# Patient Record
Sex: FEMALE | Race: BLACK OR AFRICAN AMERICAN | Employment: FULL TIME | URBAN - METROPOLITAN AREA
[De-identification: names, ages, dates, MRNs, and addresses within clinical notes are randomized per-mention and may not be internally consistent; named-entity substitution may affect disease eponyms.]

---

## 2017-06-26 ENCOUNTER — OFFICE VISIT (OUTPATIENT)
Dept: OBGYN CLINIC | Age: 52
End: 2017-06-26
Payer: COMMERCIAL

## 2017-06-26 ENCOUNTER — HOSPITAL ENCOUNTER (OUTPATIENT)
Age: 52
Setting detail: SPECIMEN
Discharge: HOME | End: 2017-06-26
Payer: COMMERCIAL

## 2017-06-26 VITALS
WEIGHT: 177 LBS | SYSTOLIC BLOOD PRESSURE: 122 MMHG | BODY MASS INDEX: 33.42 KG/M2 | DIASTOLIC BLOOD PRESSURE: 80 MMHG | HEIGHT: 61 IN

## 2017-06-26 DIAGNOSIS — Z12.31 VISIT FOR SCREENING MAMMOGRAM: ICD-10-CM

## 2017-06-26 DIAGNOSIS — Z01.419 ENCOUNTER FOR GYNECOLOGICAL EXAMINATION WITHOUT ABNORMAL FINDING: Primary | ICD-10-CM

## 2017-06-26 PROCEDURE — 99396 PREV VISIT EST AGE 40-64: CPT | Performed by: OBSTETRICS & GYNECOLOGY

## 2017-06-26 RX ORDER — TOLTERODINE TARTRATE 2 MG/1
2 TABLET, EXTENDED RELEASE ORAL 2 TIMES DAILY
COMMUNITY
End: 2019-10-21 | Stop reason: SDUPTHER

## 2017-06-28 LAB — CYTOLOGY REPORT: NORMAL

## 2017-11-16 RX ORDER — TOLTERODINE 4 MG/1
4 CAPSULE, EXTENDED RELEASE ORAL DAILY
Qty: 30 CAPSULE | Refills: 11 | Status: SHIPPED | OUTPATIENT
Start: 2017-11-16 | End: 2018-11-16 | Stop reason: SDUPTHER

## 2018-07-05 ENCOUNTER — HOSPITAL ENCOUNTER (OUTPATIENT)
Age: 53
Setting detail: SPECIMEN
Discharge: HOME | End: 2018-07-05
Payer: COMMERCIAL

## 2018-07-05 ENCOUNTER — OFFICE VISIT (OUTPATIENT)
Dept: OBGYN CLINIC | Age: 53
End: 2018-07-05
Payer: COMMERCIAL

## 2018-07-05 VITALS
BODY MASS INDEX: 35.12 KG/M2 | DIASTOLIC BLOOD PRESSURE: 78 MMHG | SYSTOLIC BLOOD PRESSURE: 122 MMHG | HEIGHT: 61 IN | WEIGHT: 186 LBS

## 2018-07-05 DIAGNOSIS — D05.12 DUCTAL CARCINOMA IN SITU (DCIS) OF LEFT BREAST: ICD-10-CM

## 2018-07-05 DIAGNOSIS — D05.01 NEOPLASM OF RIGHT BREAST, PRIMARY TUMOR STAGING CATEGORY TIS: LOBULAR CARCINOMA IN SITU (LCIS): ICD-10-CM

## 2018-07-05 DIAGNOSIS — Z01.419 ENCOUNTER FOR GYNECOLOGICAL EXAMINATION WITHOUT ABNORMAL FINDING: Primary | ICD-10-CM

## 2018-07-05 DIAGNOSIS — Z12.31 VISIT FOR SCREENING MAMMOGRAM: ICD-10-CM

## 2018-07-05 PROCEDURE — 99396 PREV VISIT EST AGE 40-64: CPT | Performed by: OBSTETRICS & GYNECOLOGY

## 2018-07-05 RX ORDER — MULTIVIT WITH MINERALS/LUTEIN
250 TABLET ORAL DAILY
COMMUNITY

## 2018-07-05 NOTE — PROGRESS NOTES
Used     History   Alcohol Use No     Current Outpatient Prescriptions   Medication Sig Dispense Refill    Ascorbic Acid (VITAMIN C) 250 MG tablet Take 250 mg by mouth daily      tolterodine (DETROL LA) 4 MG extended release capsule Take 1 capsule by mouth daily 30 capsule 11    Loratadine (CLARITIN PO) Take by mouth      rosuvastatin (CRESTOR) 10 MG tablet Take 10 mg by mouth daily.  tolterodine (DETROL) 2 MG tablet Take 2 mg by mouth 2 times daily       No current facility-administered medications for this visit. Allergies: Allergies   Allergen Reactions    Latex     Shellfish-Derived Products Swelling       Gynecologic History:  No LMP recorded. Patient has had a hysterectomy.   Sexually Active: No  STD History: No  Abnormal Pap History no  Birth Control: No    Obstetric History       T0      L0     SAB0   TAB0   Ectopic0   Molar0   Multiple0   Live Births0      ______________________________________________________________________  REVIEW OF SYSTEMS:        Constitutional:  Unexpected weight change no  Neurological:  Frequent headaches  no  Ophthalmic:  Recent visual changes no  ENT:   Difficulty swallowing  no  Breast:              Masses   no     Respiratory:  Shortness of breath  no    Cardiovascular: Chest pain   no     Gastrointestinal: Chronic diarrhea/constipation no   Urogenital:  Urinary incontinence  yes                                         Heavy/irregular periods           no                                      Vaginal discharge                   no  Hematological: Bruises easy   no     Endocrine:  Nipple Discharge  no     Hot/Cold Intolerance  no   Psychological:  Mood and affect were wnl yes                                                                                                                                           Physical Exam:    Vitals:    18 0939   BP: 122/78   Site: Left Arm   Position: Sitting   Cuff Size: Medium Adult       General Appearance:  She does not appear to be in any distress. This  is a well developed, well nourished, well groomed female. Neurological:  The patient is alert and oriented to time, place, person, and situation without any noted sensory motor deficits. Skin:  A brief inspection of the skin revealed no rashes or lesions. Neck:  The neck was supple. There is no tracheal deviation, thyromegaly or supraclavicular adenopathy appreciated. Breast:   The patients breasts have multiple bilateral reconstruction scars present. Breasts are nontender and there  were no definite suspicious masses, discharge or pathologic skin changes. There is no supraclavicular or axillary adenopathy bilaterally. Respiratory: There was unlabored respiratory effort. Lungs clear to ascultation without wheezes, rales or rhonchi in all fields bilaterally. Cardiovascular:  Normal sinus rhythm with a regular rate and without murmur, rubs or gallops. Abdomen: The abdomen was soft and non-tender with no guarding, rebound, CVAT or rigidity. No hernias were appreciated. Bowel sounds were normally active. Pelvic exam:  No vulvar or vaginal lesions noted. Normal vaginal discharge present without significant cystocele, rectocele or enterocele. Uterus surgically absent with good support of the vaginal apex. Adnexa nontender and without abnormal masses bilaterally. Extremities:  Mild right upper extremity lymphedema present. FROM and nontender without clubbing or cyanosis. ASSESSMENT:         ICD-10-CM ICD-9-CM    1. Encounter for gynecological examination without abnormal finding Z01.419 V72.31 PAP SMEAR   2. Ductal carcinoma in situ (DCIS) of left breast D05.12 233.0    3. Neoplasm of right breast, primary tumor staging category Tis: lobular carcinoma in situ (LCIS) D05.01 233.0                    PLAN:  We discussed her history of breast cancer as well as her family history.   She agrees to genetic counseling

## 2018-07-17 ENCOUNTER — TELEPHONE (OUTPATIENT)
Dept: OBGYN CLINIC | Age: 53
End: 2018-07-17

## 2018-07-17 DIAGNOSIS — D05.12 DUCTAL CARCINOMA IN SITU (DCIS) OF LEFT BREAST: ICD-10-CM

## 2018-07-17 DIAGNOSIS — Z90.13 HISTORY OF BILATERAL MASTECTOMY: ICD-10-CM

## 2018-07-17 DIAGNOSIS — D05.02 LOBULAR CARCINOMA IN SITU (LCIS) OF LEFT BREAST: Primary | ICD-10-CM

## 2018-07-18 ENCOUNTER — INITIAL CONSULT (OUTPATIENT)
Dept: ONCOLOGY | Age: 53
End: 2018-07-18
Payer: COMMERCIAL

## 2018-07-18 DIAGNOSIS — D05.00 NEOPLASM OF BREAST, PRIMARY TUMOR STAGING CATEGORY TIS: LOBULAR CARCINOMA IN SITU (LCIS), UNSPECIFIED LATERALITY: ICD-10-CM

## 2018-07-18 DIAGNOSIS — D05.10 DUCTAL CARCINOMA IN SITU (DCIS) OF BREAST, UNSPECIFIED LATERALITY: Primary | ICD-10-CM

## 2018-07-18 DIAGNOSIS — Z80.0 FAMILY HISTORY OF COLON CANCER: ICD-10-CM

## 2018-07-18 PROCEDURE — 96040 PR GENETIC COUNSELING, EACH 30 MIN: CPT | Performed by: GENETIC COUNSELOR, MS

## 2018-07-19 LAB — CYTOLOGY REPORT: NORMAL

## 2018-08-15 ENCOUNTER — TELEPHONE (OUTPATIENT)
Dept: ONCOLOGY | Age: 53
End: 2018-08-15

## 2018-11-16 RX ORDER — TOLTERODINE 4 MG/1
CAPSULE, EXTENDED RELEASE ORAL
Qty: 30 CAPSULE | Refills: 10 | Status: SHIPPED | OUTPATIENT
Start: 2018-11-16 | End: 2019-10-19 | Stop reason: SDUPTHER

## 2019-06-26 ENCOUNTER — TELEPHONE (OUTPATIENT)
Dept: OBGYN CLINIC | Age: 54
End: 2019-06-26

## 2019-06-26 NOTE — TELEPHONE ENCOUNTER
Called and cancelled appt due to Dr Simeon Segovia being in surgery  Left message to return our call

## 2019-07-09 ENCOUNTER — OFFICE VISIT (OUTPATIENT)
Dept: OBGYN CLINIC | Age: 54
End: 2019-07-09
Payer: COMMERCIAL

## 2019-07-09 ENCOUNTER — HOSPITAL ENCOUNTER (OUTPATIENT)
Age: 54
Setting detail: SPECIMEN
Discharge: HOME | End: 2019-07-09
Payer: COMMERCIAL

## 2019-07-09 VITALS
DIASTOLIC BLOOD PRESSURE: 72 MMHG | HEIGHT: 60 IN | BODY MASS INDEX: 35.73 KG/M2 | SYSTOLIC BLOOD PRESSURE: 122 MMHG | WEIGHT: 182 LBS

## 2019-07-09 DIAGNOSIS — Z01.419 ENCOUNTER FOR GYNECOLOGICAL EXAMINATION WITHOUT ABNORMAL FINDING: Primary | ICD-10-CM

## 2019-07-09 PROCEDURE — 99396 PREV VISIT EST AGE 40-64: CPT | Performed by: OBSTETRICS & GYNECOLOGY

## 2019-07-09 RX ORDER — VALACYCLOVIR HYDROCHLORIDE 500 MG/1
500 TABLET, FILM COATED ORAL 2 TIMES DAILY
COMMUNITY

## 2019-07-09 NOTE — PROGRESS NOTES
tablet Take 500 mg by mouth 2 times daily      tolterodine (DETROL LA) 4 MG extended release capsule TAKE 1 CAPSULE BY MOUTH ONE TIME A DAY  30 capsule 10    Ascorbic Acid (VITAMIN C) 250 MG tablet Take 250 mg by mouth daily      Loratadine (CLARITIN PO) Take by mouth      rosuvastatin (CRESTOR) 10 MG tablet Take 10 mg by mouth daily.  tolterodine (DETROL) 2 MG tablet Take 2 mg by mouth 2 times daily       No current facility-administered medications for this visit. Allergies: Allergies   Allergen Reactions    Latex     Shellfish-Derived Products Swelling       Gynecologic History:  No LMP recorded. Patient has had a hysterectomy.   Sexually Active: No  STD History: No  Abnormal Pap History no  Birth Control: No    OB History    Para Term  AB Living   0 0 0 0 0 0   SAB TAB Ectopic Molar Multiple Live Births   0 0 0 0 0 0     ______________________________________________________________________  REVIEW OF SYSTEMS:        Constitutional:  Unexpected weight change no  Neurological:  Frequent headaches  no  Ophthalmic:  Recent visual changes no  ENT:   Difficulty swallowing  no  Breast:              Masses   no     Respiratory:  Shortness of breath  no    Cardiovascular: Chest pain   no     Gastrointestinal: Chronic diarrhea/constipation no   Urogenital:  Urinary incontinence  no                                         Heavy/irregular periods           no                                      Vaginal discharge                   no  Hematological: Bruises easy   no     Endocrine:  Nipple Discharge  yes     Hot/Cold Intolerance  no   Psychological:  Mood and affect were wnl yes                                                                                                                                           Physical Exam:    Vitals:    19 0915   BP: 122/72   Site: Right Upper Arm   Position: Sitting   Cuff Size: Medium Adult   Weight: 182 lb (82.6 kg)   Height: 5' 0.08\" (1.526

## 2019-07-16 LAB — CYTOLOGY REPORT: NORMAL

## 2019-10-21 RX ORDER — TOLTERODINE 4 MG/1
CAPSULE, EXTENDED RELEASE ORAL
Qty: 30 CAPSULE | Refills: 9 | Status: SHIPPED | OUTPATIENT
Start: 2019-10-21 | End: 2020-09-24

## 2019-12-05 ENCOUNTER — HOSPITAL ENCOUNTER (OUTPATIENT)
Dept: OCCUPATIONAL THERAPY | Age: 54
Setting detail: THERAPIES SERIES
Discharge: HOME | End: 2019-12-05
Payer: COMMERCIAL

## 2019-12-05 PROCEDURE — 97535 SELF CARE MNGMENT TRAINING: CPT

## 2019-12-05 PROCEDURE — 97166 OT EVAL MOD COMPLEX 45 MIN: CPT

## 2019-12-17 ENCOUNTER — HOSPITAL ENCOUNTER (OUTPATIENT)
Dept: OCCUPATIONAL THERAPY | Age: 54
Setting detail: THERAPIES SERIES
Discharge: HOME | End: 2019-12-17
Payer: COMMERCIAL

## 2019-12-17 PROCEDURE — 97110 THERAPEUTIC EXERCISES: CPT

## 2019-12-17 PROCEDURE — 97535 SELF CARE MNGMENT TRAINING: CPT

## 2020-01-14 ENCOUNTER — HOSPITAL ENCOUNTER (OUTPATIENT)
Dept: OCCUPATIONAL THERAPY | Age: 55
Setting detail: THERAPIES SERIES
Discharge: HOME | End: 2020-01-14
Payer: COMMERCIAL

## 2020-05-26 ENCOUNTER — HOSPITAL ENCOUNTER (OUTPATIENT)
Dept: OCCUPATIONAL THERAPY | Age: 55
Setting detail: THERAPIES SERIES
Discharge: HOME | End: 2020-05-26

## 2020-05-26 NOTE — DISCHARGE SUMMARY
[x] [facility]) - [facility]  [facility]rapy  [address]  [phone]  F: [phone] [] [address] [facility] [address]   [address]  P: [phone]  F: [phone] [] [place]  [address] [place] [address]  P: [phone]  F: [phone] [] [address] [facility]   [address]   [place]: [phone]  F: [phone]           Lisa Vanessa   1965   [id number]    5/26/2020    Pt called in stating she wanted to cancel remaining visits and would call back when she was ready to proceed. Pt has not returned the call in 60-90 days and will be discharged at this time.      Electronically signed by Mariya Sandoval OT on 5/26/2020 at 3:35 PM

## 2020-07-16 ENCOUNTER — HOSPITAL ENCOUNTER (OUTPATIENT)
Age: 55
Setting detail: SPECIMEN
Discharge: HOME | End: 2020-07-16
Payer: COMMERCIAL

## 2020-07-16 ENCOUNTER — OFFICE VISIT (OUTPATIENT)
Dept: OBGYN CLINIC | Age: 55
End: 2020-07-16
Payer: COMMERCIAL

## 2020-07-16 VITALS
SYSTOLIC BLOOD PRESSURE: 118 MMHG | HEIGHT: 60 IN | DIASTOLIC BLOOD PRESSURE: 78 MMHG | WEIGHT: 153 LBS | BODY MASS INDEX: 30.04 KG/M2

## 2020-07-16 PROCEDURE — 99396 PREV VISIT EST AGE 40-64: CPT | Performed by: OBSTETRICS & GYNECOLOGY

## 2020-07-16 RX ORDER — ZINC GLUCONATE 50 MG
50 TABLET ORAL DAILY
COMMUNITY

## 2020-07-16 ASSESSMENT — PATIENT HEALTH QUESTIONNAIRE - PHQ9
SUM OF ALL RESPONSES TO PHQ9 QUESTIONS 1 & 2: 0
SUM OF ALL RESPONSES TO PHQ QUESTIONS 1-9: 0
SUM OF ALL RESPONSES TO PHQ QUESTIONS 1-9: 0
1. LITTLE INTEREST OR PLEASURE IN DOING THINGS: 0
2. FEELING DOWN, DEPRESSED OR HOPELESS: 0

## 2020-07-16 NOTE — PROGRESS NOTES
DATE OF VISIT:  20        History and Physical    Yisel Petersen    :  1965  CHIEF COMPLAINT:    Chief Complaint   Patient presents with    Annual Exam     Here for annual  Last pap 19neg                     HPI :   Yisel Petersen is a 54 y.o. female. Nulligravida    Yisel Petersen returns today for her annual exam.  She continues to be followed at [facility] for her breast care however they are not doing mammograms or MRIs. She is having regular bowel movements without constipation or diarrhea. She denies any UTI symptoms and is still having small involuntary loss of urine with coughing and sneezing. She is otherwise without any significant complaints today. Ingrid Narayan is still working for [employer] and doing it from home.  _____________________________________________________________________  Past Medical History:   Diagnosis Date    Elevated cholesterol     Fibroid     Fissure, anal                                                                    Past Surgical History:   Procedure Laterality Date    BREAST BIOPSY      Left    BREAST RECONSTRUCTION  2014  10/15    MONTSERRAT Flap reconstruction. Left breast Reconstruction    BREAST SURGERY Bilateral 2014    Masectomy- LCIS in both breasts. Elective.      COLONOSCOPY  2010    HYSTERECTOMY  2003    partial    HYSTEROSCOPY  2003     ALBERTO    OTHER SURGICAL HISTORY      Lumpectomy Left breast    WISDOM TOOTH EXTRACTION       Family History   Problem Relation Age of Onset    Colon Cancer Father 61    Hypertension Brother     Heart Attack Brother     Diabetes Brother     Hypertension Sister     Cancer Other         35s; pat great aunt      Social History     Tobacco Use   Smoking Status Never Smoker   Smokeless Tobacco Never Used     Social History     Substance and Sexual Activity   Alcohol Use No     Current Outpatient Medications   Medication Sig Dispense Refill    zinc gluconate 50 MG tablet Take 50 mg by mouth daily      tolterodine (DETROL LA) 4 MG extended release capsule TAKE 1 CAPSULE BY MOUTH ONE TIME A DAY  30 capsule 9    valACYclovir (VALTREX) 500 MG tablet Take 500 mg by mouth 2 times daily      Ascorbic Acid (VITAMIN C) 250 MG tablet Take 250 mg by mouth daily      Loratadine (CLARITIN PO) Take by mouth      rosuvastatin (CRESTOR) 10 MG tablet Take 10 mg by mouth daily. No current facility-administered medications for this visit. Allergies: Allergies   Allergen Reactions    Latex     Raspberry     Shellfish-Derived Products Swelling       Gynecologic History:  No LMP recorded. Patient has had a hysterectomy.   Sexually Active: Yes  STD History: No  Abnormal Pap History no  Birth Control: No    OB History    Para Term  AB Living   0 0 0 0 0 0   SAB TAB Ectopic Molar Multiple Live Births   0 0 0 0 0 0     ______________________________________________________________________  REVIEW OF SYSTEMS:        Constitutional:  Unexpected weight change no  Neurological:  Frequent headaches  no  Ophthalmic:  Recent visual changes no  ENT:   Difficulty swallowing  no  Breast:              Masses   no     Respiratory:  Shortness of breath  no    Cardiovascular: Chest pain   no     Gastrointestinal: Chronic diarrhea/constipation no   Urogenital:  Urinary incontinence  no                                         Heavy/irregular periods           no                                      Vaginal discharge                   no  Hematological: Bruises easy   no     Endocrine:  Nipple Discharge  no     Hot/Cold Intolerance  no   Psychological:  Mood and affect were wnl yes                                                                                                                                           Physical Exam:    Vitals:    20 0939   BP: 118/78   Site: Right Upper Arm   Position: Sitting   Cuff Size: Medium Adult   Weight: 153 lb (69.4 kg)   Height: 5' (1.524 m) General Appearance:  She does not appear to be in any distress. This  is a well developed, well nourished, well groomed female. Neurological:  The patient is alert and oriented to time, place, person, and situation without any noted sensory motor deficits. Skin:  A brief inspection of the skin revealed no rashes or lesions. Neck:  The neck was supple. There is no tracheal deviation, thyromegaly or supraclavicular adenopathy appreciated. Breast:   The patients breasts reveal bilateral reconstruction with significant keloid formation. Breasts are nontender and there  were no masses, discharge or pathologic skin changes. There is no supraclavicular or axillary adenopathy bilaterally. Respiratory: There was unlabored respiratory effort. Lungs clear to ascultation without wheezes, rales or rhonchi in all fields bilaterally. Cardiovascular:  Normal sinus rhythm with a regular rate and without murmur, rubs or gallops. Abdomen: The abdomen was soft and non-tender with no guarding, rebound, CVAT or rigidity. No hernias were appreciated. Bowel sounds were normally active. Pelvic exam:  No vulvar or vaginal lesions noted. Normal vaginal discharge present, no significant cystocele, rectocele or enterocele noted. [place] well supported. Uterus surgically absent and adnexa nontender and without abnormal masses bilaterally. Extremities:  FROM and nontender without clubbing cyanosis or edema. ASSESSMENT:         ICD-10-CM    1. Encounter for gynecological examination without abnormal finding  Z01.419 PAP SMEAR   2. Visit for screening mammogram  Z12.31                    PLAN:  Current Pap smear guidelines discussed and will discontinue annual surveillance. Return to the office in 1 year or when necessary  Patient was seen with total face to face time of 20 minutes. Sunshine Muro M.D., Mph  [facility]  [place]

## 2020-07-21 LAB — CYTOLOGY REPORT: NORMAL

## 2021-08-26 NOTE — TELEPHONE ENCOUNTER
[facility]   Hereditary Cancer Risk Assessment     Name: Angie Kumar  YOB: 1965  Date of Results Disclosure: 8/17/18    HISTORY   Ms. Irma Santos was seen for genetic counseling on 7/18/18 at the request of Dr. Jay Cooper due to her personal and family history of cancer. At that time, Ms. Irma Santos chose to pursue genetic testing via the CancerNext gene panel. These results were discussed with Ms. Irma Santos on 8/17/18 via telephone. A summary of Ms. Nohemy Cox results and recommendations are below. RESULTS  [employer] CancerNext Panel: NEGATIVE - NO CLINICALLY SIGNIFICANT MUTATIONS DETECTED   This panel included the analysis of 34 genes associated with hereditary cancer including: APC, PAULINE, BARD1, BMPR1A, BRCA1, BRCA2, BRIP1, CDH1, CDK4, CDKN2A, CHEK2, DICER1, HOXB13, EPCAM, GREM1, MLH1, MRE11A, MSH2, MSH6, MUTYH, NBN, NF1, PALB2, PMS2, POLD1, POLE, PTEN, RAD50, RAD51C, RAD51D, SMAD4, SMARCA4, STK11, and TP53. Please refer to genetic test report for technical details. We discussed that Ms. Hernandez's negative test result greatly reduces the likelihood that her personal history of cancer is due to a hereditary mutation. It is possible that her personal history of cancer may be due to a gene for which testing was not performed or which has yet to be discovered. RECOMMENDATIONS  BREAST CANCER   The outcome of Ms. Nohemy Cox genetic test results do not affect her lifetime risk for breast cancer and should continue breast cancer surveillance as directed by her physicians. OVARIAN CANCER  Without a known hereditary mutation and no known family history of ovarian cancer, Ms. Nohemy Cox risk for ovarian cancer is expected to be equal to the general population risk of 1-2%. Therefore, ovarian cancer screening or risk reducing surgery is not recommended. She should continue gynecologic cancer screening as directed by her physicians.      GENERAL CANCER   Ms. no

## 2021-12-06 ENCOUNTER — HOSPITAL ENCOUNTER (OUTPATIENT)
Age: 56
Setting detail: SPECIMEN
Discharge: HOME | End: 2021-12-06

## 2021-12-06 ENCOUNTER — NURSE ONLY (OUTPATIENT)
Dept: FAMILY MEDICINE CLINIC | Age: 56
End: 2021-12-06

## 2021-12-06 DIAGNOSIS — Z11.52 ENCOUNTER FOR SCREENING FOR COVID-19: Primary | ICD-10-CM

## 2021-12-06 ASSESSMENT — PATIENT HEALTH QUESTIONNAIRE - PHQ9
SUM OF ALL RESPONSES TO PHQ QUESTIONS 1-9: 0
1. LITTLE INTEREST OR PLEASURE IN DOING THINGS: 0
SUM OF ALL RESPONSES TO PHQ QUESTIONS 1-9: 0
SUM OF ALL RESPONSES TO PHQ9 QUESTIONS 1 & 2: 0
SUM OF ALL RESPONSES TO PHQ QUESTIONS 1-9: 0
2. FEELING DOWN, DEPRESSED OR HOPELESS: 0

## 2021-12-06 NOTE — PROGRESS NOTES
Pt to parking spot 3 for covid testing  Verified name/spelling/ w/ patient  Swabbed nasopharyngeal and sent specimen to lab  Pt tolerated

## 2021-12-07 LAB
SARS-COV-2: NORMAL
SARS-COV-2: NOT DETECTED
SOURCE: NORMAL

## 2022-03-03 ENCOUNTER — HOSPITAL ENCOUNTER (EMERGENCY)
Age: 57
Discharge: HOME | End: 2022-03-03
Attending: EMERGENCY MEDICINE
Payer: COMMERCIAL

## 2022-03-03 ENCOUNTER — APPOINTMENT (OUTPATIENT)
Dept: GENERAL RADIOLOGY | Age: 57
End: 2022-03-03
Payer: COMMERCIAL

## 2022-03-03 VITALS
SYSTOLIC BLOOD PRESSURE: 124 MMHG | TEMPERATURE: 98.2 F | HEART RATE: 83 BPM | OXYGEN SATURATION: 97 % | RESPIRATION RATE: 18 BRPM | BODY MASS INDEX: 34.55 KG/M2 | HEIGHT: 61 IN | DIASTOLIC BLOOD PRESSURE: 75 MMHG | WEIGHT: 183 LBS

## 2022-03-03 DIAGNOSIS — R07.9 CHEST PAIN, UNSPECIFIED TYPE: Primary | ICD-10-CM

## 2022-03-03 LAB
ABSOLUTE EOS #: 0.2 K/UL (ref 0–0.4)
ABSOLUTE LYMPH #: 2.8 K/UL (ref 1–4.8)
ABSOLUTE MONO #: 0.6 K/UL (ref 0.1–1.2)
ALBUMIN SERPL-MCNC: 4.7 G/DL (ref 3.5–5.2)
ALBUMIN/GLOBULIN RATIO: 1.6 (ref 1–2.5)
ALP BLD-CCNC: 76 U/L (ref 35–104)
ALT SERPL-CCNC: 18 U/L (ref 5–33)
ANION GAP SERPL CALCULATED.3IONS-SCNC: 13 MMOL/L (ref 9–17)
AST SERPL-CCNC: 16 U/L
BASOPHILS # BLD: 1 % (ref 0–2)
BASOPHILS ABSOLUTE: 0 K/UL (ref 0–0.2)
BILIRUB SERPL-MCNC: 0.53 MG/DL (ref 0.3–1.2)
BUN BLDV-MCNC: 14 MG/DL (ref 6–20)
CALCIUM SERPL-MCNC: 10.2 MG/DL (ref 8.6–10.4)
CHLORIDE BLD-SCNC: 96 MMOL/L (ref 98–107)
CO2: 27 MMOL/L (ref 20–31)
CREAT SERPL-MCNC: 0.62 MG/DL (ref 0.5–0.9)
D-DIMER QUANTITATIVE: 0.33 MG/L FEU
EOSINOPHILS RELATIVE PERCENT: 3 % (ref 1–4)
GFR AFRICAN AMERICAN: >60 ML/MIN
GFR NON-AFRICAN AMERICAN: >60 ML/MIN
GFR SERPL CREATININE-BSD FRML MDRD: ABNORMAL ML/MIN/{1.73_M2}
GLUCOSE BLD-MCNC: 107 MG/DL (ref 70–99)
HCT VFR BLD CALC: 42.1 % (ref 36–46)
HEMOGLOBIN: 14 G/DL (ref 12–16)
LIPASE: 27 U/L (ref 13–60)
LYMPHOCYTES # BLD: 52 % (ref 24–44)
MCH RBC QN AUTO: 29.7 PG (ref 26–34)
MCHC RBC AUTO-ENTMCNC: 33.3 G/DL (ref 31–37)
MCV RBC AUTO: 89.1 FL (ref 80–100)
MONOCYTES # BLD: 11 % (ref 2–11)
PDW BLD-RTO: 14.1 % (ref 12.5–15.4)
PLATELET # BLD: 283 K/UL (ref 140–450)
PMV BLD AUTO: 8.3 FL (ref 6–12)
POTASSIUM SERPL-SCNC: 3.6 MMOL/L (ref 3.7–5.3)
RBC # BLD: 4.73 M/UL (ref 4–5.2)
SEG NEUTROPHILS: 33 % (ref 36–66)
SEGMENTED NEUTROPHILS ABSOLUTE COUNT: 1.8 K/UL (ref 1.8–7.7)
SODIUM BLD-SCNC: 136 MMOL/L (ref 135–144)
TOTAL PROTEIN: 7.7 G/DL (ref 6.4–8.3)
TROPONIN, HIGH SENSITIVITY: <6 NG/L (ref 0–14)
TROPONIN, HIGH SENSITIVITY: <6 NG/L (ref 0–14)
WBC # BLD: 5.4 K/UL (ref 3.5–11)

## 2022-03-03 PROCEDURE — 85379 FIBRIN DEGRADATION QUANT: CPT

## 2022-03-03 PROCEDURE — 99285 EMERGENCY DEPT VISIT HI MDM: CPT

## 2022-03-03 PROCEDURE — 71045 X-RAY EXAM CHEST 1 VIEW: CPT

## 2022-03-03 PROCEDURE — 80053 COMPREHEN METABOLIC PANEL: CPT

## 2022-03-03 PROCEDURE — 93005 ELECTROCARDIOGRAM TRACING: CPT | Performed by: PHYSICIAN ASSISTANT

## 2022-03-03 PROCEDURE — 84484 ASSAY OF TROPONIN QUANT: CPT

## 2022-03-03 PROCEDURE — 85025 COMPLETE CBC W/AUTO DIFF WBC: CPT

## 2022-03-03 PROCEDURE — 36415 COLL VENOUS BLD VENIPUNCTURE: CPT

## 2022-03-03 PROCEDURE — 83690 ASSAY OF LIPASE: CPT

## 2022-03-03 ASSESSMENT — PAIN DESCRIPTION - PAIN TYPE: TYPE: ACUTE PAIN

## 2022-03-03 ASSESSMENT — PAIN - FUNCTIONAL ASSESSMENT: PAIN_FUNCTIONAL_ASSESSMENT: 0-10

## 2022-03-03 ASSESSMENT — PAIN DESCRIPTION - ORIENTATION: ORIENTATION: LEFT

## 2022-03-03 ASSESSMENT — PAIN DESCRIPTION - LOCATION: LOCATION: CHEST

## 2022-03-03 ASSESSMENT — PAIN SCALES - GENERAL: PAINLEVEL_OUTOF10: 1

## 2022-03-03 NOTE — ED PROVIDER NOTES
[address] [facility]  [address] [facility] [address] [facility] [place] [address]  Phone: [phone]  Fax: [phone]      Attending Physician [address]       Chief Complaint   Patient presents with    Chest Pain       DIAGNOSTIC RESULTS     LABS:  Labs Reviewed   CBC WITH AUTO DIFFERENTIAL - Abnormal; Notable for the following components:       Result Value    Seg Neutrophils 33 (*)     Lymphocytes 52 (*)     All other components within normal limits   COMPREHENSIVE METABOLIC PANEL W/ REFLEX TO MG FOR LOW K - Abnormal; Notable for the following components:    Glucose 107 (*)     Potassium 3.6 (*)     Chloride 96 (*)     All other components within normal limits   TROPONIN   LIPASE   D-DIMER, QUANTITATIVE   TROPONIN       All other labs were within normal range or not returned as of this dictation. RADIOLOGY:  XR CHEST PORTABLE    (Results Pending)         EMERGENCY DEPARTMENT COURSE:   Vitals:    Vitals:    03/03/22 1441 03/03/22 1444 03/03/22 1549   BP:  (!) 160/89 124/78   Pulse:  91 77   Resp:  14 12   Temp:  98.2 °F (36.8 °C)    TempSrc:  Oral    SpO2:  100% 98%   Weight: 83 kg (183 lb)     Height: 5' 1\" (1.549 m)       -------------------------  BP: 124/78, Temp: 98.2 °F (36.8 °C), Pulse: 77, Resp: 12             PERTINENT ATTENDING PHYSICIAN COMMENTS:    I performed a history and physical examination of the patient and discussed management with the mid level provider. I reviewed the mid level provider's note and agree with the documented findings and plan of care. Any areas of disagreement are noted on the chart. I was personally present for the key portions of any procedures. I have documented in the chart those procedures where I was not present during the key portions. I have reviewed the emergency nurses triage note.  I agree with the chief complaint, past medical history, past surgical history, allergies, medications, social and family history as documented unless otherwise noted below. Documentation of the HPI, Physical Exam and Medical Decision Making performed by mid level providers is based on my personal performance of the HPI, PE and MDM. For Physician Assistant/ Nurse Practitioner cases/documentation I have personally evaluated this patient and have completed at least one if not all key elements of the E/M (history, physical exam, and MDM). Additional findings are as noted. 30-year-old female presents complaining of what she calls twinges of left-sided chest pain, and left arm pain. No known history of coronary artery disease. She does have some family history of heart disease. No shortness of breath. On physical exam, she is alert and afebrile. Breath sounds are clear and equal bilaterally. Cardiac exam with a normal rate, regular rhythm. Abdomen is soft and nontender. Extremities with no edema or calf tenderness.     EKG Interpretation    Interpreted by emergency department physician    Rhythm: normal sinus   Rate: normal  Axis: normal  Ectopy: none  Conduction: normal  ST Segments: normal  T Waves: normal  Q Waves: none    Clinical Impression: non-specific EKG    Hernando Sierra MD        (Please note that portions of this note were completed with a voice recognition program.  Efforts were made to edit the dictations but occasionally words are mis-transcribed.)    Hernando Sierra MD  Attending Emergency Medicine Physician       Hernando Sierra MD  03/03/22 5281

## 2022-03-03 NOTE — ED PROVIDER NOTES
[address] [facility]  [address] [facility] [address]  Phone: [phone]  Fax: Mauricio Garay [phone]      Pt Name: Robert Fair  MRN: [id number]  [place] 1965  Date of evaluation: 3/3/2022  Provider: Jason Burk PA-C    CHIEF COMPLAINT       Chief Complaint   Patient presents with    Chest Pain         HISTORY OF PRESENT ILLNESS  (Location/Symptom, Timing/Onset, Context/Setting, Quality, Duration, Modifying Factors, Severity.)   Robert Fair is a 64 y.o. female who presents to the emergency department complaining of chest symptoms. Context/Setting:   Patient here for evaluation of some mild paresthesias of the left upper chest and arm over the last week that was nontraumatic in nature. There is no focal weaknesses associated with this. Nor any pleuritic pain or respiratory symptoms. Patient states that yesterday she started noting some very mild twinges of pain of the left upper chest that were not pleuritic in nature. This was not associated with any radiation of this pain to the back or abdomen. She states that it was about 1-2 on the pain scale was not exertional in nature. She denies any tenderness of this area of chest wall. Patient has a previous history of breast cancer requiring elective bilateral mastectomy. This was back in 2014 and she is no longer treated for this. She denies any significant personal cardiac history. She denies any abdominal/urinary or stooling changes. No DVT/PE history.         Timing/Onset:   As above  Quality:   Twinge of pain  Duration:   As above  Modifying Factors:   none  Severity:   Mild      Heart Score         Score 0 - 3 = 2.5%  MACE over next 6 wks = Discharge home  Score 4 - 6 = 20.3%  MACE over next 6 wks = Obs admit  Score 7 - 10 = 72.7%  MACE over next 6 wks = Early invasive Rx       HEART Risk Score:   History         Highly Suspicious                    2 Moderately Suspicious            1                      Slight Suspicious                     0  EKG            Significant ST Depression       2                      Nonspecific                              1                      Normal                                     0  Age              > or = 65                                  2                      > 45 to < 65                             1                     < or = 45                                  0  Risk Factors    > or = 3                         2                          1 or 2                            1                          0                                 0  Troponin          > or = 3 times normal limit       2                          > 1 and < 3 times limit             1                          Normal                                     0  Score               0 - 3      Low Risk                           4 - 6      Moderate risk                           7 - 10    High Risk    * Risk Factors include: Diabetes, Tobacco use, Hypertension, Hyperlipidemia, Family History of CAD and Obesity    Risk Stratification for Thoracic Aortic Dissection (TAD)  Sudden Onset, Maximal at Onset, with radiation to back -  No  Family history of TAD - No  Neurologic Deficits with Chest Pain -    No  History of connective tissue disorder (i.e. Marfans Syndrome, Ambreen-Danlos Syndrome) - No  Laguerres Syndrome - No  History of hypertension - No  History of aortic valve disease - No  Pregnancy - No  Elevated D-Dimer with any of the above   pending      PERC Criteria     Age      > or = 50    Yes  Heart Rate     > or = 100   No  Pulse Oximetry    >  95%    Yes  Previous History of DVT   No  Trauma or Surgery within 4 Weeks-   No  Hemoptysis-    No  Exogenous Estrogen use-  No  Unilateral Leg Swelling-   No         Nursing Notes were reviewed.     REVIEW OF SYSTEMS    (2-9 systems for level 4, 10 or more for level 5)     Constitutional: Denies recent fever, reports that she has never smoked. She has never used smokeless tobacco. She reports that she does not drink alcohol and does not use drugs. Lives with others. PHYSICAL EXAM    (up to 7 for level 4, 8 or more for level 5)     ED Triage Vitals   BP Temp Temp Source Pulse Resp SpO2 Height Weight   03/03/22 1444 03/03/22 1444 03/03/22 1444 03/03/22 1444 03/03/22 1444 03/03/22 1444 03/03/22 1441 03/03/22 1441   (!) 160/89 98.2 °F (36.8 °C) Oral 91 14 100 % 5' 1\" (1.549 m) 183 lb (83 kg)       Constitutional:  Well developed   Eyes:  Pupils equal/round  HENT:  Atraumatic, external ears normal, nose normal, oropharynx moist. Neck- supple   Respiratory:  Clear to auscultation bilaterally with good air exchange, no W/R/R. No chestwall tenderness  Cardiovascular:  RRR with normal S1 and S2. Gastrointestinal/Abdomen:  Soft, NT. BS wnl. Musculoskeletal:  Normal to inspection. No pitting edema or calf TTP. Back:  No CVA tenderness. Normal to inspection. Integument:  No rash.   Neurologic:  Alert, appropriate mentation/interaction, no focal deficits noted     DIAGNOSTIC RESULTS     EKG: All EKG's are interpreted by the Emergency Department Physician who either signs or Co-signs this chart in the absence of a cardiologist.    Not indicated, or per attending note      RADIOLOGY:   Non-plain film images such as CT, Ultrasound and MRI are read by the radiologist. Plain radiographic images are visualized and preliminarily interpreted by the emergency physician with the below findings:      Interpretation per the Radiologist below, if available at the time of this note:    XR CHEST PORTABLE   Final Result   No acute cardiopulmonary disease               LABS:  Labs Reviewed   CBC WITH AUTO DIFFERENTIAL - Abnormal; Notable for the following components:       Result Value    Seg Neutrophils 33 (*)     Lymphocytes 52 (*)     All other components within normal limits   COMPREHENSIVE METABOLIC PANEL W/ REFLEX TO MG FOR LOW K - Abnormal; Notable for the following components:    Glucose 107 (*)     Potassium 3.6 (*)     Chloride 96 (*)     All other components within normal limits   TROPONIN   LIPASE   D-DIMER, QUANTITATIVE   TROPONIN       All other labs were within normal range or not returned as of this dictation. EMERGENCY DEPARTMENT COURSE and DIFFERENTIAL DIAGNOSIS/MDM:   Vitals:    Vitals:    03/03/22 1441 03/03/22 1444 03/03/22 1549 03/03/22 1704   BP:  (!) 160/89 124/78 137/80   Pulse:  91 77 82   Resp:  14 12 15   Temp:  98.2 °F (36.8 °C)     TempSrc:  Oral     SpO2:  100% 98% 98%   Weight: 83 kg (183 lb)      Height: 5' 1\" (1.549 m)        No orders of the defined types were placed in this encounter. 1509  Paresthesias of left arm x 1 week and now with intermittent mild twinge of left chest pain x 1 day. AVSS, will recheck BP. Cardiac workup initiated. 1539  This patient was seen by the attending physician and they agreed with the assessment and plan. I have reviewed the disposition diagnosis with the patient and or their family/guardian. I have answered their questions and given discharge instructions. They voiced understanding of these instructions and did not have any further questions or complaints. The patient presents with chest pain that is not suggestive of in nature of pulmonary embolus, pneumothorax, aortic dissection, cardiac ischemia, aortic dissection, or other serious etiology. Given the extremely low risk of these diagnoses further testing and evaluation are not indicated at this time. The patient has been instructed to follow up with their primary care physician and to return immediately to an ED if the symptoms change or worsen in any way. Patient verbalized understanding. CONSULTS:  None    PROCEDURES:  None    FINAL IMPRESSION      1.  Chest pain, unspecified type          DISPOSITION/PLAN   DISPOSITION        PATIENT REFERRED TO:  MD Wes Murry [address]    Schedule an appointment as soon as possible for a visit in 2 days  for re-evaluation of your symptoms    [facility] ED  [address] [place] [address]  [phone]  Go to   for worsening of symptoms      DISCHARGE MEDICATIONS:  New Prescriptions    No medications on file       (Please note that portions of this note were completed with a voice recognition program.  Efforts were made to edit the dictations but occasionally words are mis-transcribed.)    MADIE Puga PA-C  03/03/22 8408

## 2022-03-04 LAB
EKG ATRIAL RATE: 86 BPM
EKG P AXIS: 42 DEGREES
EKG P-R INTERVAL: 152 MS
EKG Q-T INTERVAL: 382 MS
EKG QRS DURATION: 78 MS
EKG QTC CALCULATION (BAZETT): 457 MS
EKG R AXIS: 13 DEGREES
EKG T AXIS: 23 DEGREES
EKG VENTRICULAR RATE: 86 BPM